# Patient Record
Sex: MALE | Race: WHITE | Employment: FULL TIME | URBAN - METROPOLITAN AREA
[De-identification: names, ages, dates, MRNs, and addresses within clinical notes are randomized per-mention and may not be internally consistent; named-entity substitution may affect disease eponyms.]

---

## 2021-07-25 RX ORDER — DEXTROMETHORPHAN/PSEUDOEPHED 2.5-7.5/.8
1.2 DROPS ORAL
Status: CANCELLED | OUTPATIENT
Start: 2021-07-25

## 2021-07-25 RX ORDER — DIPHENHYDRAMINE HYDROCHLORIDE 50 MG/ML
50 INJECTION, SOLUTION INTRAMUSCULAR; INTRAVENOUS ONCE
Status: CANCELLED | OUTPATIENT
Start: 2021-07-25 | End: 2021-07-25

## 2021-07-25 RX ORDER — SODIUM CHLORIDE 0.9 % (FLUSH) 0.9 %
5-40 SYRINGE (ML) INJECTION AS NEEDED
Status: CANCELLED | OUTPATIENT
Start: 2021-07-25

## 2021-07-25 RX ORDER — SODIUM CHLORIDE 0.9 % (FLUSH) 0.9 %
5-40 SYRINGE (ML) INJECTION EVERY 8 HOURS
Status: CANCELLED | OUTPATIENT
Start: 2021-07-25

## 2021-07-25 RX ORDER — EPINEPHRINE 0.1 MG/ML
1 INJECTION INTRACARDIAC; INTRAVENOUS
Status: CANCELLED | OUTPATIENT
Start: 2021-07-25 | End: 2021-07-26

## 2021-07-25 RX ORDER — ATROPINE SULFATE 0.1 MG/ML
0.5 INJECTION INTRAVENOUS
Status: CANCELLED | OUTPATIENT
Start: 2021-07-25 | End: 2021-07-26

## 2021-07-27 ENCOUNTER — HOSPITAL ENCOUNTER (OUTPATIENT)
Age: 66
Setting detail: OUTPATIENT SURGERY
Discharge: HOME | End: 2021-07-27
Attending: INTERNAL MEDICINE | Admitting: INTERNAL MEDICINE
Payer: MEDICARE

## 2021-07-27 VITALS
OXYGEN SATURATION: 96 % | BODY MASS INDEX: 26.39 KG/M2 | HEIGHT: 66 IN | TEMPERATURE: 96.8 F | WEIGHT: 164.2 LBS | DIASTOLIC BLOOD PRESSURE: 63 MMHG | HEART RATE: 57 BPM | SYSTOLIC BLOOD PRESSURE: 118 MMHG | RESPIRATION RATE: 16 BRPM

## 2021-07-27 PROCEDURE — 76040000007: Performed by: INTERNAL MEDICINE

## 2021-07-27 PROCEDURE — 77030013991 HC SNR POLYP ENDOSC BSC -A: Performed by: INTERNAL MEDICINE

## 2021-07-27 PROCEDURE — 88305 TISSUE EXAM BY PATHOLOGIST: CPT

## 2021-07-27 PROCEDURE — 74011250636 HC RX REV CODE- 250/636: Performed by: INTERNAL MEDICINE

## 2021-07-27 PROCEDURE — 99153 MOD SED SAME PHYS/QHP EA: CPT | Performed by: INTERNAL MEDICINE

## 2021-07-27 PROCEDURE — G0500 MOD SEDAT ENDO SERVICE >5YRS: HCPCS | Performed by: INTERNAL MEDICINE

## 2021-07-27 PROCEDURE — 77030039961 HC KT CUST COLON BSC -D: Performed by: INTERNAL MEDICINE

## 2021-07-27 PROCEDURE — 2709999900 HC NON-CHARGEABLE SUPPLY: Performed by: INTERNAL MEDICINE

## 2021-07-27 PROCEDURE — 77030040361 HC SLV COMPR DVT MDII -B: Performed by: INTERNAL MEDICINE

## 2021-07-27 RX ORDER — MIDAZOLAM HYDROCHLORIDE 1 MG/ML
.25-5 INJECTION, SOLUTION INTRAMUSCULAR; INTRAVENOUS
Status: DISCONTINUED | OUTPATIENT
Start: 2021-07-27 | End: 2021-07-27 | Stop reason: HOSPADM

## 2021-07-27 RX ORDER — FLUMAZENIL 0.1 MG/ML
0.2 INJECTION INTRAVENOUS
Status: DISCONTINUED | OUTPATIENT
Start: 2021-07-27 | End: 2021-07-27 | Stop reason: HOSPADM

## 2021-07-27 RX ORDER — SODIUM CHLORIDE 9 MG/ML
1000 INJECTION, SOLUTION INTRAVENOUS CONTINUOUS
Status: DISCONTINUED | OUTPATIENT
Start: 2021-07-27 | End: 2021-07-27 | Stop reason: HOSPADM

## 2021-07-27 RX ORDER — FENTANYL CITRATE 50 UG/ML
100 INJECTION, SOLUTION INTRAMUSCULAR; INTRAVENOUS
Status: DISCONTINUED | OUTPATIENT
Start: 2021-07-27 | End: 2021-07-27 | Stop reason: HOSPADM

## 2021-07-27 RX ORDER — OMEGA-3-ACID ETHYL ESTERS 1 G/1
2 CAPSULE, LIQUID FILLED ORAL
COMMUNITY

## 2021-07-27 RX ORDER — LEVOTHYROXINE SODIUM 25 UG/1
25 TABLET ORAL
COMMUNITY

## 2021-07-27 RX ORDER — NALOXONE HYDROCHLORIDE 0.4 MG/ML
0.4 INJECTION, SOLUTION INTRAMUSCULAR; INTRAVENOUS; SUBCUTANEOUS
Status: DISCONTINUED | OUTPATIENT
Start: 2021-07-27 | End: 2021-07-27 | Stop reason: HOSPADM

## 2021-07-27 RX ORDER — ATORVASTATIN CALCIUM 10 MG/1
10 TABLET, FILM COATED ORAL DAILY
COMMUNITY

## 2021-07-27 RX ADMIN — SODIUM CHLORIDE 1000 ML: 900 INJECTION, SOLUTION INTRAVENOUS at 08:30

## 2021-07-27 NOTE — DISCHARGE INSTRUCTIONS
René Amador  [id number]  1955    COLON DISCHARGE INSTRUCTIONS    Discomfort:  Redness at IV site- apply warm compress to area; if redness or soreness persist- contact your physician  There may be a slight amount of blood passed from the rectum  Gaseous discomfort- walking, belching will help relieve any discomfort  You may not operate a vehicle til the next day. You may not engage in an occupation involving machinery or appliances til the next day. You may not drink alcoholic beverages til the next day. DIET:   High fiber diet. ACTIVITY:  You may not  resume your normal daily activities til the next day. it is recommended that you spend the remainder of the day resting -  avoid any strenuous activity. CALL M.D.  IF ANY SIGN OF:   Increasing pain, nausea, vomiting  Abdominal distension (swelling)  New increased bleeding (oral or rectal)  Fever (chills)  Pain in chest area  Bloody discharge from nose or mouth  Shortness of breath    You may not  take any Advil, Aspirin, Ibuprofen, Motrin, Aleve, or Goodys for 7 days, ONLY  Tylenol as needed for pain. Post procedure diagnosis:  POLYPS    Follow-up Instructions: Your follow up colonoscopy will be in 10 years. We will notify you the results of your biopsy by letter within 2 weeks.     Gemini Marie MD  July 27, 2021       DISCHARGE SUMMARY from Nurse    The following personal items collected during your admission are returned to you:   Dental Appliance: Dental Appliances: None  Vision: Visual Aid: Glasses, With patient  Hearing Aid:    Jewelry:    Clothing:    Other Valuables:    Valuables sent to safe:              PATIENT INSTRUCTIONS:    After general anesthesia or intravenous sedation, for 24 hours or while taking prescription Narcotics:  · Limit your activities  · Do not drive and operate hazardous machinery  · Do not make important personal or business decisions  · Do  not drink alcoholic beverages  · If you have not urinated within 8 hours after discharge, please contact your surgeon on call. Report the following to your surgeon:  · Excessive pain, swelling, redness or odor of or around the surgical area  · Temperature over 100.5  · Nausea and vomiting lasting longer than 4 hours or if unable to take medications  · Any signs of decreased circulation or nerve impairment to extremity: change in color, persistent  numbness, tingling, coldness or increase pain  · Any questions      No orders of the defined types were placed in this encounter. What to do at Home:  Recommended activity: as above,     If you experience any of the following symptoms as above, please follow up with Dr. Debra Sandoval. *  Please give a list of your current medications to your Primary Care Provider. *  Please update this list whenever your medications are discontinued, doses are      changed, or new medications (including over-the-counter products) are added. *  Please carry medication information at all times in case of emergency situations. These are general instructions for a healthy lifestyle:    No smoking/ No tobacco products/ Avoid exposure to second hand smoke    Surgeon General's Warning:  Quitting smoking now greatly reduces serious risk to your health. Obesity, smoking, and sedentary lifestyle greatly increases your risk for illness    A healthy diet, regular physical exercise & weight monitoring are important for maintaining a healthy lifestyle    You may be retaining fluid if you have a history of heart failure or if you experience any of the following symptoms:  Weight gain of 3 pounds or more overnight or 5 pounds in a week, increased swelling in our hands or feet or shortness of breath while lying flat in bed. Please call your doctor as soon as you notice any of these symptoms; do not wait until your next office visit.     Recognize signs and symptoms of STROKE:    F-face looks uneven    A-arms unable to move or move unevenly    S-speech slurred or non-existent    T-time-call 911 as soon as signs and symptoms begin-DO NOT go       Back to bed or wait to see if you get better-TIME IS BRAIN. The discharge information has been reviewed with the patient and spouse. The patient and spouse verbalized understanding. Warning Signs of HEART ATTACK     Call 911 if you have these symptoms:   Chest discomfort. Most heart attacks involve discomfort in the center of the chest that lasts more than a few minutes, or that goes away and comes back. It can feel like uncomfortable pressure, squeezing, fullness, or pain.  Discomfort in other areas of the upper body. Symptoms can include pain or discomfort in one or both arms, the back, neck, jaw, or stomach.  Shortness of breath with or without chest discomfort.  Other signs may include breaking out in a cold sweat, nausea, or lightheadedness. Don't wait more than five minutes to call 911 - MINUTES MATTER! Fast action can save your life. Calling 911 is almost always the fastest way to get lifesaving treatment. Emergency Medical Services staff can begin treatment when they arrive -- up to an hour sooner than if someone gets to the hospital by car. The discharge information has been reviewed with the patient and caregiver. The patient and caregiver verbalized understanding. Discharge medications reviewed with the patient and guardian and appropriate educational materials and side effects teaching were provided.     Patient armband removed and shredded

## 2021-07-27 NOTE — PROCEDURES
[facility]  Colonoscopy Procedure Report  _______________________________________________________  Patient: Chucho Benitez                                        Attending Physician: Kari Noe MD    Patient ID: [id number]                                    Referring Physician: Colton Brito MD    Exam Date: 2021      Introduction: A  74 y.o. male patient, presents for inpatient Colonoscopy    Indications: Pt of Dr. Tatiana Simpson, here for 5yr Recall, for surveillance of colonic adenomas. Last colonoscopy was done on 2015 by Dr. Jada Das @[facility], 1x polyp removed (Colonic Mucosa  w/Focal Glandular Hyperplasia & Benign lymphoid aggregate), 5yr Recall. Some of old records unavailable at this time  Average risk, no FHx of colon cancer. Mother  of Lung cancer (Mid 63's). Asymptomatic of GI complaints. BMI: 27.28, BM: /day. PM/SH: Thyroid nodule s/p right partial thyroidectomy () - now on Synthroid, HLD, GERD. Consent: The benefits, risks, and alternatives to the procedure were discussed and informed consent was obtained from the patient. Preparation: EKG, pulse, pulse oximetry and blood pressure were monitored throughout the procedure. ASA Classification: Class II- . The heart is an S1-S2 and regular heart rate and rhythm. Lungs are clear to auscultation and percussion. Abdomen is soft, nondistended, and nontender. Mental Status: awake, alert, and oriented to person, place, and time    Medications:  · Fentanyl 100 mcg IV before procedure. · Versed 2 mg IV throughout the procedure. Rectal Exam: Normal Rectal Exam. No Blood. Prostate not enlarged    Pathology Specimens:  1    Procedure: The colonoscope was passed with ease through the anus under direct visualization and advanced to the cecum and 10 cm inside the terminal ileum. Retroflexion is made in the ascending colon. The scope was withdrawn and the mucosa was carefully examined.  The quality of the preparation was excellent. The views were excellent. The patient's toleration of the procedure was excellent. The exam was done twice to the cecum. Total time is 27 minutes and withdrawal time is 16 minutes. Findings:    Rectum:   Small internal hemorrhoids. Sigmoid:   normal  Descending Colon:   Normal   Transverse Colon:   Very mild diverticulosis in the proximal transverse colon. Ascending Colon:   6.5 mm sessile polyp in the proximal ascending colon, cold snared. Cecum:   Normal  Terminal Ileum:   Normal.       Unplanned Events: There were no unplanned events. Estimated Blood Loss: None  IMPLANTS: * No implants in log *  Impressions: Small internal hemorrhoids. Very mild diverticulosis in the proximal transverse colon. 6.5 mm sessile polyp in the proximal ascending colon, cold snared. Normal Mucosa. No blood or AVM found. Complications: None; patient tolerated the procedure well. Recommendations:  · Discharge home when standard parameters are met. · Resume a high fiber diet. · Resume own medications. Avoid all NSAID's for 7 days  · Colonoscopy recommendation in 10 years.   · Take Miralax and/ or Colace 100 mg on regular basis if constipated    Procedure Codes:    Wolfgang Bell [[id number]]    Endoscope Information:  Model Number(s)    [id number]   Assistant: None  Signed By: Gemini Marie MD Date: 7/27/2021

## 2021-07-27 NOTE — H&P
Assessment/Plan  # Detail Type Description    1. Assessment Personal history of colonic polyps (Z86.010). Impression Pt of Dr. Dixie Galicia, here for 5yr Recall, for surveillance of colonic adenomas. Last colonoscopy was done on 2015 by Dr. Tayo Mari @[facility], 1x polyp removed (Colonic Mucosa  w/Focal Glandular Hyperplasia & Benign lymphoid aggregate), 5yr Recall. Some of old records unavailable at this time, any records recovered will be scanned to chart at later date. Average risk, no FHx of colon cancer. Mother  of Lung cancer (Mid 63's). Asymptomatic of GI complaints. BMI: 27.28, BM: 1/day. PM/SH: Thyroid nodule s/p right partial thyroidectomy (2019) - now on Synthroid, HLD, GERD. Patient Plan *C-scope Plan:  Colonoscopy ordered with Dr. Michelle Ballesteros with Miralax bowel prep, and Mag Citrate 2 days before prep, and Miralax and stool softeners starting 3 days before prep.  -Patient is advised to take Thyroid meds, BP meds, beta blocker and any cardiac meds the AM of procedure with sip clear liquid after prep. *C-scope Risks:  Stressed importance of following all bowel preparation instructions. Explained the procedure to the patient including all risks and benefits. These risks consist of missed lesions on exam, bleeding, and bowel perforation with possible need for admission to the hospital, and in the most extensive of  circumstances, the patient may require surgery. Pt verbalized understanding of these risks and is agreeable with this procedure    Plan Orders Further diagnostic evaluations ordered today include(s) DIAGNOSTIC COLONOSCOPY to be performed. 2. Assessment GERD without esophagitis (K21.9). Impression Took Zantac for one year, once Recalled he has been taking Pantoprazole 20mg PRN ever since. Heartburn is well-controlled. Last EGD was done on 2010 by Dr. Tayo Mari @[facility]: ?Short Segment Amaya's Esophagus, Hiatal Hernia (Bx: Negative for Amaya's)  Op-notes unavailable. Patient Plan *PPI Dosing Instructions: (Printed copy provided to patient)  -Instructed patient to take PPI first thing in the morning on an empty stomach with a full glass of water, 30 minutes before eating any food to get the full effect of the medication as intended. Use Tums, or Maalox for breakthrough reflux symptoms. ____________________________  *GERD \"Anti-Reflux Diet\"- Patient is advised to: (Printed copy provided to patient)  1. Eat smaller meals and maintain and lowfat, low carb diet  2. Change to decaffeinated beverages only  3. Not eat within 3-4 hours of bedtime  4. To elevate the torso while sleeping at night either by sleeping on a foam incline wedge or by elevating the head of the bed. 5. Avoid alcohol, NSAIDs, citrus and acidic foods as these things can contribute to acid reflux              This 74year old  patient was referred by Jasmyn Herrera.    This 74year old male presents for Hx polyp/colon cancer. History of Present Illness  1. Hx polyp/colon cancer   Prior screening:  colonoscopy. Denies risk factors. Pertinent negatives include abdominal pain, change in bowel habits, change in stool caliber, constipation, decreased appetite, diarrhea, melena, nausea, rectal bleeding, vomiting, weight gain and weight loss. Additional information: No family history of colon cancer and BM 1x daily. last colonoscopy was done 8/7/15, polyps found ( adenomas). last EGD was done 2010    Past Medical/Surgical History   (Detailed)  Disease/disorder Onset Date Management Date Comments   GERD  On PPI; (Took Zantac x1yr), On Pantoprazole 20mg PRN     Thyroid Nodule  s/p Partial Right Thyroidectomy     Colonic Adenomas:  Negative Exams:  (CM w/LA)     EGD done 2010: ? Short Segment Amaya's Esophagus, Hiatal Hernia - Dr. Kita Martinez             Family History   (Detailed)    Relationship Family Member Name  Age at Death Condition Onset Age Cause of Death       No family history of (CRC) colorectal cancer. N   Mother  Y 72 Lung Cancer (Smoker) 61 Y     Social History  (Detailed)  Tobacco use reviewed. Preferred language is [place]. Marital Status/Family/Social Support  Marital status:      Tobacco use status: Current non-smoker. Smoking status: Never smoker. Tobacco Screening  Patient has never used tobacco. Patient has not used tobacco in the last 30 days. Patient has not used smokeless tobacco in the last 30 days. Smoking Status  Type Smoking Status Usage Per Day Years Used Pack Years Total Pack Years    Never smoker         Alcohol  There is no history of alcohol use. Caffeine  The patient uses caffeine: coffee - 1 cup a day. Medications (active prior to today)  Medication Instructions Start Date Stop Date Refilled Elsewhere   Synthroid 25 mcg tablet take 1 tablet by oral route  every day //   Y     Patient Status   Completed with information received for patient in a summary of care record. Medication Reconciliation  Medications reconciled today. Medication Reviewed  Adherence Medication Name Sig Desc Elsewhere Status   taking as directed Synthroid 25 mcg tablet take 1 tablet by oral route  every day Y Verified     Medications (Added, Continued or Stopped today)  Start Date Medication Directions PRN Status PRN Reason Instruction Stop Date   07/13/2021 Gas-X Extra Strength 125 mg chewable tablet Follow colonoscopy bowel preparation instructions. N      07/13/2021 magnesium citrate oral solution Follow colonoscopy bowel preparation instructions. N      07/13/2021 Miralax 17 gram/dose oral powder Follow colonoscopy procedure bowel preparation instructions. N       Synthroid 25 mcg tablet take 1 tablet by oral route  every day N        Orders  Status Lab Order Time Frame Comments   ordered DIAGNOSTIC COLONOSCOPY         Review of Systems  System Neg/Pos Details   Constitutional Negative Fever, Weight gain and Weight loss.    ENMT Negative Sinus Infection. Eyes Negative Double vision. Respiratory Negative Asthma, Chronic cough and Dyspnea. Cardio Negative Chest pain, Edema and Irregular heartbeat/palpitations. GI Negative Abdominal pain, Change in bowel habits, Change in stool caliber, Constipation, Decreased appetite, Diarrhea, Dysphagia, Heartburn, Hematemesis, Hematochezia, Melena, Nausea, Rectal bleeding, Reflux and Vomiting.  Negative Dysuria and Hematuria. Endocrine Negative Cold intolerance and Heat intolerance. Neuro Negative Dizziness, Headache, Numbness and Tremors. Psych Negative Anxiety, Depression and Increased stress. Integumentary Negative Hives, Pruritus and Rash. MS Negative Back pain, Joint pain and Myalgia. Hema/Lymph Negative Easy bleeding, Easy bruising and Lymphadenopathy. Allergic/Immuno Negative Food allergies and Immunosuppression. Vital Signs   Height  Time ft in cm Last Measured Height Position   8:22 AM 5.0 6.00 167.64 07/13/2021 Standing     Weight/BSA/BMI  Time lb oz kg Context BMI kg/m2 BSA m2   8:22 .00  76.657 dressed with shoes 27.28 1.89     Blood Pressure  Time BP mm/Hg Position Side Site Method Cuff Size   8:22 /80 sitting right arm automatic adult     Temperature/Pulse/Respiration  Time Temp F Temp C Temp Site Pulse/min Pattern Resp/ min   8:22 AM 98.00 36.67  70  18     Measured by  Time Measured by   8:22 AM Jolynn Powell     Physical  Exam  Exam Findings Details   Constitutional Normal Well developed. Eyes Normal Conjunctiva - Right: Normal, Left: Normal. Sclera - Right: Normal, Left: Normal.   Nasopharynx Normal Lips/teeth/gums - Normal.   Neck Exam Normal Inspection - Normal.   Respiratory Normal Inspection - Normal.   Cardiovascular Normal Rhythm - Regular. Extra sounds - None. Murmurs - None.    Vascular Normal Pulses - Brachial: Normal.   Skin Normal Inspection - Normal.   Musculoskeletal Normal Hands/Wrist - Right: Normal, Left: Normal.   Extremity Normal No edema. Neurological Normal Fine motor skills - Normal.   Psychiatric Normal Orientation - Oriented to time, place, person & situation. Appropriate mood and affect.      Active Patient Care Team Members  Name Contact Agency Type Support Role Relationship Active Date Inactive Date Specialty   Ramona Mcburney   Patient provider PCP      [facility]   encounter provider    Gastroenterology       No change in H&P

## 2021-09-15 ENCOUNTER — HOSPITAL ENCOUNTER (OUTPATIENT)
Dept: PHYSICAL THERAPY | Age: 66
Discharge: HOME | End: 2021-09-15
Payer: MEDICARE

## 2021-09-15 PROCEDURE — 97161 PT EVAL LOW COMPLEX 20 MIN: CPT

## 2021-09-15 PROCEDURE — 97110 THERAPEUTIC EXERCISES: CPT

## 2021-09-15 NOTE — PROGRESS NOTES
[facility] at [address] [place] [address] Drive: [phone]   Fax: [phone]  PLAN OF CARE / [address] [place] [address] PHYSICAL THERAPY SERVICES  Patient Name: Ricardo Bardales : 1955   Medical   Diagnosis: Left shoulder pain [M25.512]  Left ankle pain [M25.572] Treatment Diagnosis: Left shoulder pain  Left ankle pain   Onset Date: 2021 - ankle  2021 - shoulder     Referral Source: Mona Duffy MD Start of Care [facility]): 9/15/2021   Prior Hospitalization: See medical history Provider #: [id number]   Prior Level of Function: avid bicyclist 2-3 times per week   Comorbidities: hypothyroid, recurrent ankle sprains bilaterally   Medications: Verified on Patient Summary List   The Plan of Care and following information is based on the information from the initial evaluation.   ===========================================================================================  Assessment / key information:  Ricardo Bardales is a 74 y.o.  male who presents with  signs/symtoms of left glenohumeral joint capsular tightness resulting in left shoulder decreased AROM and functional strength and pain with sleeping on left side. Patient also presents with hx of recurrent bilateral ankle sprains most recent with severe left ankle sprain resulting in decreased AROM, strength and balance left ankle. Patient will continue to benefit from skilled PT services to modify and progress therapeutic interventions, address functional mobility deficits, address ROM deficits, address strength deficits, analyze and address soft tissue restrictions, analyze and cue movement patterns, analyze and modify body mechanics/ergonomics and assess and modify postural abnormalities to attain remaining goals.     Pt instructed in HEP and will f/u in clinic for PT.  ===========================================================================================  Eval Complexity: History MEDIUM Complexity : 1-2 comorbidities / personal factors will impact the outcome/ POC ;  Examination  LOW Complexity : 1-2 Standardized tests and measures addressing body structure, function, activity limitation and / or participation in recreation ; Presentation LOW Complexity : Stable, uncomplicated ;  Decision Making MEDIUM Complexity : FOTO score of 26-74; : FOTO score = an established functional score where 100 = no disability  Overall Complexity LOW   Problem List: pain affecting function, decrease ROM, decrease strength, impaired gait/ balance, decrease ADL/ functional abilitiies, decrease activity tolerance and decrease flexibility/ joint mobility   Treatment Plan may include any combination of the following: Therapeutic exercise, Therapeutic activities, Neuromuscular re-education, Physical agent/modality, Gait/balance training, Manual therapy, Aquatic therapy, Patient education, Functional mobility training and Home safety training  Patient / Family readiness to learn indicated by: asking questions, trying to perform skills and interest  Persons(s) to be included in education: patient (P)  Barriers to Learning/Limitations: no  Measures Taken: NA  Patient Goal (s): \"Be able to sleep on left side without pain. \"   Patient self reported health status: good  Rehabilitation Potential: good  Goals:  Short Term Goals: To be accomplished in 4 weeks:   Patient will report compliance with initial/basic HEP at least 1x/day to aid in rehabilitation program.   Status at IE: Patient instructed in and provided written copy of initial Home Exercise Program.   Current: Same as IE     Patient will increase left shoulder pain free AROM into flexion and abduction to 150 degrees to aid in completion of ADLs. Status at IE: flex 132, abd 141   Current: Same as IE      Long Term Goals:  To be accomplished in 8 weeks:   Patient will report compliance with comprehensive HEP a least 3-4x/week to aid in rehabilitation/strengthening program.   Status at IE: Patient instructed in and provided written copy of initial Home Exercise Program.   Current: Same as IE     Patient will report no pain greater than 1-2/10 with overhead activities to aid in completion of ADLs. Status at IE: left shoulder 2-8/10   Current: Same as IE     Patient will increase left shoulder and left ankle strength to 5/5 throughout all planes to aid in completion of ADLs. Status at IE:  Left shoulder flex and ER 4-, abduction 3+. Left ankle DF, Inv, EV 4   Current: Same as IE     Patient will increase FOTO (an established functional score where 100 = no disability) score left ankle and left shoulder  to 75 points overall to demonstrate improvement in functional status. Status at IE: left shoulder 70, left ankle 57   Current: Same as IE        Frequency / Duration:   Patient to be seen 2  times per week for 8  weeks:  Patient / Caregiver education and instruction: self care and exercises      . Therapist Signature: Nena Guerrero DPT Date: 6/38/6882   Certification Period: 9/15/2021 to 12/11/2021 Time: 12:11 PM   ===========================================================================================  I certify that the above Physical Therapy Services are being furnished while the patient is under my care. I agree with the treatment plan and certify that this therapy is necessary. Physician Signature:        Date:       Time:        Mars Benavidez MD    Please sign and return to [facility] or you may fax the signed copy to [phone]. Thank you.

## 2021-09-15 NOTE — PROGRESS NOTES
PT DAILY TREATMENT NOTE/SHOULDER EVAL 10-18    Patient Name: Renetta Huizar  Date:9/15/2021  : 1955  [x]  Patient  Verified  Payor: Farzad e[employer]ldman / Plan: [place] / Product Type: Medicare /    In time: 8521  Out time:1148  Total Treatment Time (min): 25  Visit #: 1 of 16    Medicare/BCBS Only   Total Timed Codes (min):  25 1:1 Treatment Time:  25       Treatment Area: Left shoulder pain [M25.512]  Left ankle pain [M25.572]    SUBJECTIVE  Pain Level (0-10 scale): left shoulder 2-8, left ankle 2-8  []constant [x]intermittent []improving []worsening []no change since onset    Any medication changes, allergies to medications, adverse drug reactions, diagnosis change, or new procedure performed?: [x] No    [] Yes (see summary sheet for update)  Subjective functional status/changes:     Patient has c/c of left ankle pain since 2021 when he sustained an inversion sprain and has noted persistent pain, swelling and clicking since that time. Patient also notes gradual onset of left shoulder pain since 2021 which has been interfering primarily with ability to sleep on left side. Patient describes pain as deep ache inside left shoulder and anterolateral \"soreness\" left ankle. Pain is worse in PM. Denies numbness/tingling. Aggravating factors: . Alleviating factors: shoulder reaching behind back and laying on left side, weight bearing activities. Denies red flags: SOB, chest pain, dizziness/lightheadedness, blurred/double vision, HA, chills/fevers, night sweats, change in bowel/bladder control, abdominal pain, difficulty swallowing, slurred speech, unexplained weight gain/loss, nausea, vomiting. PMHx: hypothyroid, recurrent ankle sprains bilaterally. Surgical Hx: partial thyroidectomy. Social Hx: , two level home, no alcohol, no tobacco, full time sedentary work. PLOF: avid bicyclist 2-3 times per week. CLOF: moderate difficulty sleeping due to left UE pain.   Diagnostic Imaging: none. Recent falls Hx:none. OBJECTIVE/EXAMINATION    Precautions: none    20 min [x]Eval                  []Re-Eval       25 min Therapeutic Exercise:  [x] See flow sheet :   Rationale: increase ROM, increase strength and decrease pain to improve the patients ability to complete ADLs            With   [] TE   [] TA   [] neuro   [] other: Patient Education: [x] Review HEP    [] Progressed/Changed HEP based on:   [] positioning   [] body mechanics   [] transfers   [] heat/ice application    [] other:        Physical Therapy Evaluation - Shoulder    Posture: [] Poor    [] Fair    [x] Good    Describe:    ROM:  [] Unable to assess at this time                                           AROM                                                              Strength   Left Right  Left Right   Flexion 132 157 Flexion 4- 5   Extension 51 75 Extension 5 5   Scaption/ 168 Scaption/ABD 3+ 5   ER @ 0 Degrees 65 75 ER @ 0 Degrees 4- 5   IR @ 60 Degrees 75 75 ER @ 90 Degrees 4- 5   Apley IR T11 T5 IR @ 90 Degrees 5 5     End Feel / Painful Arc: positive left    Scapulohumoral Control / Rhythm:  Able to eccentrically lower with good control?  Left: [] Yes   [x] No     Right: [x] Yes   [] No    Accessory Motions:    Palpation  [x] Min  [] Mod  [] Severe    Location: left glenohumeral joint  [] Min  [] Mod  [] Severe    Location:    Adson's Test  [] Pos   [x] Neg Yergason's Test [] Pos   [x] Neg  Calista's Test  [] Pos   [x] Neg Seneca's Sign [] Pos   [] Neg  Neer's Test  [] Pos   [x] Neg Clunk Test  [] Pos   [] Neg  Hawkin's Test  [] Pos   [x] Neg AC Joint  [] Pos   [] Neg  Speed's Test  [] Pos   [] Neg SC Joint  [] Pos   [] Neg  Empty Can  [x] Pos   [] Neg Pectoral Tightness [] Pos   [] Neg  Anterior Apprehension [] Pos   [x] Neg   Posterior Apprehension [] Pos   [x] Neg    Physical Therapy Evaluation  - Foot and Ankle    Gait: [] Normal    [] Abnormal    [x] Antalgic    [] NWB    Device: none  Describe: minimally antalgic left LE    ROM/Strength  [] Unable to assess at this time      AROM   Strength (1-5)   Left Right Left  Right   Dorsiflexion 17 15 4 5   Plantarflexion 65 60 5 5   Inversion 38 31 5 5   Eversion 7 13 5 5       Palpation:   Location:  Patient's Pain Response: [x] Min   [] Mod   [] Severe  Location: left anterior talofibular ligament  Patient's Pain Response: [] Min   [] Mod   [] Severe    Optional Tests:    Anterior Drawer: [] Neg    [x] Pos left  Posterior Drawer:  [] Neg    [x] Pos left  Inversion Stress:  [] Neg    [x] Pos left>right  Talar Tilt:   [] Neg    [x] Pos left>right  Eversion Stress:  [x] Neg    [] Pos  Darius's Sign:  [x] Neg    [] Pos  Velasco Test: [x] Neg    [] Pos    Other Tests / Comments:     Single Leg Stance Test: Eyes open right 30 seconds, left 30 seconds with marked increased sway. Eyes closed right 5 seconds, left 0 seconds. Pain Level (0-10 scale) post treatment: left ankle 1, left shoulder 0    ASSESSMENT/Changes in Function: Patient presents with signs/symtoms of left glenohumeral joint capsular tightness resulting in left shoulder decreased AROM and functional strength and pain with sleeping on left side. Patient also presents with hx of recurrent bilateral ankle sprains most recent with severe left ankle sprain resulting in decreased AROM, strength and balance left ankle. Patient will continue to benefit from skilled PT services to modify and progress therapeutic interventions, address functional mobility deficits, address ROM deficits, address strength deficits, analyze and address soft tissue restrictions, analyze and cue movement patterns, analyze and modify body mechanics/ergonomics and assess and modify postural abnormalities to attain remaining goals.      [x]  See Plan of Care  []  See progress note/recertification  []  See Discharge Summary         Progress towards goals / Updated goals:  See POC    PLAN  []  Upgrade activities as tolerated     [x]  Continue plan of care  []  Update interventions per flow sheet       []  Discharge due to:_  []  Other:_      Dudley Kasper, PT 9/15/2021  11:01 AM

## 2021-09-16 ENCOUNTER — HOSPITAL ENCOUNTER (OUTPATIENT)
Dept: PHYSICAL THERAPY | Age: 66
Discharge: HOME | End: 2021-09-16
Payer: MEDICARE

## 2021-09-16 PROCEDURE — 97110 THERAPEUTIC EXERCISES: CPT

## 2021-09-16 PROCEDURE — 97112 NEUROMUSCULAR REEDUCATION: CPT

## 2021-09-16 NOTE — PROGRESS NOTES
PT DAILY TREATMENT NOTE    Patient Name: Luis Brush  Date:2021  : 1955  [x]  Patient  Verified  Payor: Coty Conti / Plan: [place] MED[place]ARE PART A & B / Product Type: Medicare /    In time:1015  Out time:1102  Total Treatment Time (min): 47  Total Timed Codes (min): 47  1:1 Treatment Time ( [address] only): 43   Visit #: 2 of 16    Treatment Dx: Left shoulder pain [M25.512]  Left ankle pain [M25.572]    SUBJECTIVE  Pain Level (0-10 scale): 2  Any medication changes, allergies to medications, adverse drug reactions, diagnosis change, or new procedure performed?: [x] No    [] Yes (see summary sheet for update)  Subjective functional status/changes:   [] No changes reported  \"I have been doing the stretches at home and they do help the shoulder to feel noticeably better. \"    OBJECTIVE    30 min Therapeutic Exercise:  [x] See flow sheet :   Rationale: increase ROM, increase strength and decrease pain to improve the patients ability to complete ADLs  ambulation safety and efficiency in order to improve patient's ability to safely ambulate at home for self care. 13 min Neuromuscular Re-education:  [x]  See flow sheet :   Rationale: improve coordination, improve balance and increase proprioception  to improve the patients ability to complete ADLS, and decrease falls risk    With   [] TE   [] TA   [] neuro   [] other: Patient Education: [x] Review HEP    [] Progressed/Changed HEP based on:   [] positioning   [] body mechanics   [] transfers   [] heat/ice application    [] other:      Other Objective/Functional Measures: NA     Pain Level (0-10 scale) post treatment: 1    ASSESSMENT/Changes in Function: Patient instructed in additional exercises for both shoulder and ankle and provided written copy of new exercises for HEP. Patient responds well to treatment session. No adverse effects were noted from today's treatment session.      Patient will continue to benefit from skilled PT services to modify and progress therapeutic interventions, address functional mobility deficits, address ROM deficits, address strength deficits, analyze and address soft tissue restrictions, analyze and cue movement patterns, analyze and modify body mechanics/ergonomics, assess and modify postural abnormalities,  and instruct in home and community integration to attain remaining goals. [x]  See Plan of Care  []  See progress note/recertification  []  See Discharge Summary         Progress towards goals / Updated goals:  Short Term Goals: To be accomplished in 4 weeks:                   Patient will report compliance with initial/basic HEP at least 1x/day to aid in rehabilitation program.                   Status at IE: Patient instructed in and provided written copy of initial Home Exercise Program.                   Current: Patient reports good compliance with initial HEP and provided written copy of additions to HEP.     9/16/2021                      Patient will increase left shoulder pain free AROM into flexion and abduction to 150 degrees to aid in completion of ADLs. Status at IE: flex 132, abd 141                   Current: Same as IE        Long Term Goals: To be accomplished in 8 weeks:                   Patient will report compliance with comprehensive HEP a least 3-4x/week to aid in rehabilitation/strengthening program.                   Status at IE: Patient instructed in and provided written copy of initial Home Exercise Program.                   Current: Same as IE                      Patient will report no pain greater than 1-2/10 with overhead activities to aid in completion of ADLs. Status at IE: left shoulder 2-8/10                   Current: Same as IE                      Patient will increase left shoulder and left ankle strength to 5/5 throughout all planes to aid in completion of ADLs. Status at IE:  Left shoulder flex and ER 4-, abduction 3+.  Left ankle DF, Inv, EV 4                   Current: Same as IE                      Patient will increase FOTO (an established functional score where 100 = no disability) score left ankle and left shoulder  to 75 points overall to demonstrate improvement in functional status.                     Status at IE: left shoulder 70, left ankle 57                   Current: Same as IE    PLAN  []  Upgrade activities as tolerated     [x]  Continue plan of care  []  Update interventions per flow sheet       []  Discharge due to:_  []  Other:_      Florencio Ramirez PT 9/16/2021  10:18 AM    Future Appointments   Date Time Provider Cheryle Jara   9/22/2021  1:45 PM Duke Martins [facility]   9/27/2021  4:00 PM Duke Ospina [facility]   9/29/2021  8:00 AM GRACIELA Ospina [facility]   10/6/2021  9:30 AM GRACIELA Martins [facility]   10/8/2021  9:30 AM GRACIELA Martins [facility]

## 2021-09-22 ENCOUNTER — HOSPITAL ENCOUNTER (OUTPATIENT)
Dept: PHYSICAL THERAPY | Age: 66
Discharge: HOME | End: 2021-09-22
Payer: MEDICARE

## 2021-09-22 PROCEDURE — 97112 NEUROMUSCULAR REEDUCATION: CPT

## 2021-09-22 PROCEDURE — 97110 THERAPEUTIC EXERCISES: CPT

## 2021-09-22 NOTE — PROGRESS NOTES
PT DAILY TREATMENT NOTE    Patient Name: Ricardo Bardales  Date:2021  : 1955  [x]  Patient  Verified  Payor: Evelyn [place] / Plan: [place] MEDICARE PART A & B / Product Type: Medicare /    In time:147  Out time:227  Total Treatment Time (min): 40  Total Timed Codes (min): 40  1:1 Treatment Time ( [address] only): 40   Visit #: 3 of 16    Treatment Dx: Left shoulder pain [M25.512]  Left ankle pain [M25.572]    SUBJECTIVE  Pain Level (0-10 scale): 0-1  Any medication changes, allergies to medications, adverse drug reactions, diagnosis change, or new procedure performed?: [x] No    [] Yes (see summary sheet for update)  Subjective functional status/changes:   [] No changes reported  \"The exercises are really helping. My left shoulder definitely feels better. \"    OBJECTIVE    30 min Therapeutic Exercise:  [x] See flow sheet :   Rationale: increase ROM, increase strength and decrease pain to improve the patients ability to complete ADLs  ambulation safety and efficiency in order to improve patient's ability to safely ambulate at home for self care. 10 min Neuromuscular Re-education:  [x]  See flow sheet :   Rationale: improve coordination, improve balance and increase proprioception  to improve the patients ability to complete ADLS, and decrease falls risk    With   [] TE   [] TA   [] neuro   [] other: Patient Education: [x] Review HEP    [] Progressed/Changed HEP based on:   [] positioning   [] body mechanics   [] transfers   [] heat/ice application    [] other:      Other Objective/Functional Measures: NA     Pain Level (0-10 scale) post treatment: 0    ASSESSMENT/Changes in Function: Patient demonstrating excellent initial response to PT for left shoulder with ROM and exercise tolerance quickly improving. Patient responds well to treatment session. No adverse effects were noted from today's treatment session.      Patient will continue to benefit from skilled PT services to modify and progress therapeutic interventions, address functional mobility deficits, address ROM deficits, address strength deficits, analyze and address soft tissue restrictions, analyze and cue movement patterns, analyze and modify body mechanics/ergonomics, assess and modify postural abnormalities,  and instruct in home and community integration to attain remaining goals. [x]  See Plan of Care  []  See progress note/recertification  []  See Discharge Summary         Progress towards goals / Updated goals:  Short Term Goals: To be accomplished in 4 weeks:                   [facility] will report compliance with initial/basic HEP at least 1x/day to aid in rehabilitation program.                   Status at IE: Patient instructed in and provided written copy of initial Home Exercise Program.                   Current: Patient reports good compliance with initial HEP and provided written copy of additions to HEP.     9/16/2021                      Patient will increase left shoulder pain free AROM into flexion and abduction to 150 degrees to aid in completion of ADLs.                  Status at IE: flex 132, abd 141                   Current: flex improved to 142, abd remains 141.     9/22/2021, progressing        Long Term Goals: To be accomplished in 8 weeks:                   Patient will report compliance with comprehensive HEP a least 3-4x/week to aid in rehabilitation/strengthening program.                   Status at IE: Patient instructed in and provided written copy of initial Home Exercise Program.                   Current: Same as IE                      Patient will report no pain greater than 1-2/10 with overhead activities to aid in completion of ADLs.                  Status at IE: left shoulder 2-8/10                   Current: Same as IE                      Patient will increase left shoulder and left ankle strength to 5/5 throughout all planes to aid in completion of ADLs.                    Status at IE:  Left shoulder flex and ER 4-, abduction 3+. Left ankle DF, Inv, EV 4                   Current: Same as IE                      Patient will increase FOTO (an established functional score where 100 = no disability) score left ankle and left shoulder  to 75 points overall to demonstrate improvement in functional status.                     Status at IE: left shoulder 70, left ankle 57                   Current: Same as IE       PLAN  []  Upgrade activities as tolerated     [x]  Continue plan of care  []  Update interventions per flow sheet       []  Discharge due to:_  []  Other:_      Sulma Pendleton, PT 9/22/2021  1:50 PM    Future Appointments   Date Time Provider Cheryle Jara   9/27/2021  4:00 PM Duke Galvez [facility]   9/29/2021  8:00 AM Jeremiah Ma, GRACIELA CAICEDO [facility]   10/6/2021  9:30 AM Benji Zelaya, GRACIELA CAICEDO [facility]   10/8/2021  9:30 AM Nickolas Ortiz, GRACIELA CAICEDO [facility]

## 2021-09-24 ENCOUNTER — HOSPITAL ENCOUNTER (OUTPATIENT)
Dept: PHYSICAL THERAPY | Age: 66
Discharge: HOME | End: 2021-09-24
Payer: MEDICARE

## 2021-09-24 PROCEDURE — 97112 NEUROMUSCULAR REEDUCATION: CPT

## 2021-09-24 PROCEDURE — 97110 THERAPEUTIC EXERCISES: CPT

## 2021-09-24 NOTE — PROGRESS NOTES
PT DAILY TREATMENT NOTE    Patient Name: Sloan Arce  Date:2021  : 1955  [x]  Patient  Verified  Payor: Yoni Bonilla / Plan: [place] MEDICARE PART A & B / Product Type: Medicare /    In time:10:08  Out time:10:57  Total Treatment Time (min): 49  Total Timed Codes (min): 49  1:1 Treatment Time ([facility] only): 52   Visit #: 4 of 16    Treatment Area: Left shoulder pain [M25.512]  Left ankle pain [M25.572]    SUBJECTIVE  Pain Level (0-10 scale): 0/10  Any medication changes, allergies to medications, adverse drug reactions, diagnosis change, or new procedure performed?: [x] No    [] Yes (see summary sheet for update)  Subjective functional status/changes:   [] No changes reported  Pt reports he has pain in the shoulders when he gets his arm stuck in one position. Pt reports he has achiness in the ankle off and on and he will have swelling when he overdoes it. OBJECTIVE        34 min Therapeutic Exercise:  [x] See flow sheet :   Rationale: increase ROM, increase strength and improve coordination to improve the patients ability to increase patient's ease with performing functional activities and decrease overall pain and symptoms. 15 min Neuromuscular Re-education:  [x]  See flow sheet :   Rationale: improve coordination, improve balance, and increase proprioception  to improve the patients ability to increase patient's ease with performing functional activities and decrease overall pain and symptoms. With   [x] TE   [] TA   [x] neuro   [] other: Patient Education: [x] Review HEP    [] Progressed/Changed HEP based on:   [] positioning   [] body mechanics   [] transfers   [] heat/ice application    [] other:      Other Objective/Functional Measures: NA    Pain Level (0-10 scale) post treatment: 0/10    ASSESSMENT/Changes in Function: Patient tolerated treatment well with no adverse reactions today.  Pt is progressing with therapy as indicated by pt tolerating increase in exercise repetitions and resistance. Pt required cuing during BOSU step overs to increase weight shift into the forefoot as patient tended to increase weight posteriorly and fall backwards. Pt required cuing during shoulder extension to ensure upright posture. Pt required tactile and visual cuing to left shoulder during shoulder abduction and prone Y, T I for proper gleonhumeral and scapulothoracic mechanics. Pt required manual cuing to perform D2 flexion. Although showing progress patient would benefit from continuation of skilled physical therapy to address the remaining limitations. Patient will continue to benefit from skilled PT services to modify and progress therapeutic interventions, address functional mobility deficits, address ROM deficits, address strength deficits, analyze and address soft tissue restrictions, analyze and cue movement patterns, analyze and modify body mechanics/ergonomics and assess and modify postural abnormalities to attain remaining goals. []  See Plan of Care  []  See progress note/recertification  []  See Discharge Summary         Progress towards goals / Updated goals:  Short Term Goals: To be accomplished in 4 weeks:                   NLPHIGX will report compliance with initial/basic HEP at least 1x/day to aid in rehabilitation program.                   Status at IE: Patient instructed in and provided written copy of initial Home Exercise Program.                   Current: Patient reports good compliance with initial HEP and provided written copy of additions to HEP.     9/16/2021                      Patient will increase left shoulder pain free AROM into flexion and abduction to 150 degrees to aid in completion of ADLs.                    Status at IE: flex 132, abd 141                   Current: flex improved to 142, abd remains 141.     9/22/2021, progressing        Long Term Goals: To be accomplished in 8 weeks:                   Patient will report compliance with comprehensive HEP a least 3-4x/week to aid in rehabilitation/strengthening program.                   Status at IE: Patient instructed in and provided written copy of initial Home Exercise Program.                   Current: pt reports continued compliance with HEP 9/24/21                      Patient will report no pain greater than 1-2/10 with overhead activities to aid in completion of ADLs.                  Status at IE: left shoulder 2-8/10                   Current: pt reports 0/10 pain in shoulder and ankle today. Progressing 9/24/21                      Patient will increase left shoulder and left ankle strength to 5/5 throughout all planes to aid in completion of ADLs.                  Status at IE:  Left shoulder flex and ER 4-, abduction 3+. Left ankle DF, Inv, EV 4                   Current: Same as IE                      Patient will increase FOTO (an established functional score where 100 = no disability) score left ankle and left shoulder  to 75 points overall to demonstrate improvement in functional status.                     Status at IE: left shoulder 70, left ankle 57                   Current: Same as IE       PLAN  [x]  Upgrade activities as tolerated     [x]  Continue plan of care  [x]  Update interventions per flow sheet       []  Discharge due to:_  []  Other:_      Eryn Teixeira, PT 9/24/2021  10:14 AM    Future Appointments   Date Time Provider Cheryle Jara   9/24/2021 10:15 AM Naz Elias, GRACIELA CAICEDO [facility]   9/27/2021  4:00 PM Duke Bah [facility]   9/29/2021  8:00 AM Duke Bah [facility]   10/6/2021  9:30 AM Divina Rojas, GRACIELA CAICEDO [facility]   10/8/2021  9:30 AM Yuan Guzman, GRACIELA CAICEDO [facility]

## 2021-09-27 ENCOUNTER — HOSPITAL ENCOUNTER (OUTPATIENT)
Dept: PHYSICAL THERAPY | Age: 66
Discharge: HOME | End: 2021-09-27
Payer: MEDICARE

## 2021-09-27 PROCEDURE — 97112 NEUROMUSCULAR REEDUCATION: CPT

## 2021-09-27 PROCEDURE — 97110 THERAPEUTIC EXERCISES: CPT

## 2021-09-27 NOTE — PROGRESS NOTES
PT DAILY TREATMENT NOTE    Patient Name: Elizabeth Gonzales  Date:2021  : 1955  [x]  Patient  Verified  Payor: Maida Garland / Plan: [place] / Product Type: Medicare /    In time:4:00  Out time:4:45  Total Treatment Time (min): 45  Total Timed Codes (min): 45  1:1 Treatment Time ( [address] only): 45   Visit #: 5 of 16    Treatment Area: Left shoulder pain [M25.512]  Left ankle pain [M25.572]    SUBJECTIVE  Pain Level (0-10 scale): 0/10  Any medication changes, allergies to medications, adverse drug reactions, diagnosis change, or new procedure performed?: [x] No    [] Yes (see summary sheet for update)  Subjective functional status/changes:   [] No changes reported  Patient reports he felt fine after last session. Pt reports he still has tightness behind his back but after he stretches it once it feels better. OBJECTIVE      25 min Therapeutic Exercise:  [x] See flow sheet :   Rationale: increase ROM, increase strength, improve coordination and increase proprioception to improve the patients ability to increase patient's ease with performing functional activities and decrease overall pain and symptoms. 20 min Neuromuscular Re-education:  [x]  See flow sheet :   Rationale: improve coordination, improve balance, and increase proprioception  to improve the patients ability to increase patient's ease with performing functional activities and decrease overall pain and symptoms. With   [x] TE   [] TA   [x] neuro   [] other: Patient Education: [x] Review HEP    [] Progressed/Changed HEP based on:   [] positioning   [] body mechanics   [] transfers   [] heat/ice application    [] other:      Other Objective/Functional Measures: FOTO:  as indicated below. Pain Level (0-10 scale) post treatment: 0/10    ASSESSMENT/Changes in Function: Patient tolerated treatment well with no adverse reactions today.  Pt is progressing with therapy as indicated by pt tolerating increase in exercise repetitions and resistance. Pt continues to be challenged with BOSU step overs and demonstrates posterior LOB. Pt required verbal cuing to increase weight shift in to forefoot to prevent LOB. Pt was also challenged with star excursion, however required less resting breaks this session compared to last session. Although showing progress patient would benefit from continuation of skilled physical therapy to address the remaining limitations. Patient will continue to benefit from skilled PT services to modify and progress therapeutic interventions, address functional mobility deficits, address ROM deficits, address strength deficits, analyze and address soft tissue restrictions, analyze and cue movement patterns, analyze and modify body mechanics/ergonomics and assess and modify postural abnormalities to attain remaining goals. []  See Plan of Care  []  See progress note/recertification  []  See Discharge Summary         Progress towards goals / Updated goals:  Short Term Goals: To be accomplished in 4 weeks:                   JAMES will report compliance with initial/basic HEP at least 1x/day to aid in rehabilitation program.                   Status at IE: Patient instructed in and provided written copy of initial Home Exercise Program.                   Current: Patient reports good compliance with initial HEP and provided written copy of additions to HEP.     9/16/2021                      Patient will increase left shoulder pain free AROM into flexion and abduction to 150 degrees to aid in completion of ADLs.                    Status at IE: flex 132, abd 141                   Current: flex improved to 145, abd remains 165.     9/27/2021, progressing        Long Term Goals: To be accomplished in 8 weeks:                   Patient will report compliance with comprehensive HEP a least 3-4x/week to aid in rehabilitation/strengthening program.                   Status at IE: Patient instructed in and provided written copy of initial Home Exercise Program.                   Current: pt reports continued compliance with HEP 9/24/21                      Patient will report no pain greater than 1-2/10 with overhead activities to aid in completion of ADLs.                  Status at IE: left shoulder 2-8/10                   Current: pt reports 0/10 pain in shoulder and ankle today. Progressing 9/24/21                      Patient will increase left shoulder and left ankle strength to 5/5 throughout all planes to aid in completion of ADLs.                  Status at IE:  Left shoulder flex and ER 4-, abduction 3+. Left ankle DF, Inv, EV 4                   Current: Same as IE                      Patient will increase FOTO (an established functional score where 100 = no disability) score left ankle and left shoulder  to 75 points overall to demonstrate improvement in functional status.                     Status at IE: left shoulder 70, left ankle 57                   Current:  left shoulder 78, left ankle 59            PLAN  [x]  Upgrade activities as tolerated     [x]  Continue plan of care  []  Update interventions per flow sheet       []  Discharge due to:_  []  Other:_      Nahomi Simmons PT 9/27/2021  3:58 PM    Future Appointments   Date Time Provider Cheryle Jara   9/27/2021  4:00 PM Duke Campa [facility]   9/29/2021  8:00 AM GRACIELA Campa [facility]   10/6/2021  9:30 AM GRACIELA Rojas [facility]   10/8/2021  9:30 AM GRACIELA Haynes [facility]

## 2021-09-29 ENCOUNTER — HOSPITAL ENCOUNTER (OUTPATIENT)
Dept: PHYSICAL THERAPY | Age: 66
Discharge: HOME | End: 2021-09-29
Payer: MEDICARE

## 2021-09-29 PROCEDURE — 97110 THERAPEUTIC EXERCISES: CPT

## 2021-09-29 PROCEDURE — 97530 THERAPEUTIC ACTIVITIES: CPT

## 2021-10-06 ENCOUNTER — HOSPITAL ENCOUNTER (OUTPATIENT)
Dept: PHYSICAL THERAPY | Age: 66
Discharge: HOME | End: 2021-10-06
Payer: MEDICARE

## 2021-10-06 PROCEDURE — 97112 NEUROMUSCULAR REEDUCATION: CPT

## 2021-10-06 PROCEDURE — 97110 THERAPEUTIC EXERCISES: CPT

## 2021-10-06 NOTE — PROGRESS NOTES
PT DAILY TREATMENT NOTE    Patient Name: Rafia Kelly  Date:10/6/2021  : 1955  [x]  Patient  Verified  Payor: [place] MEDICARE / Plan: [place] MEDICARE PART A & B / Product Type: Medicare /    In time:930  Out time:1018  Total Treatment Time (min): 48  Total Timed Codes (min): 48  1:1 Treatment Time ( [address] only): 44   Visit #: 7 of 16    Treatment Dx: Left shoulder pain [M25.512]  Left ankle pain [M25.572]    SUBJECTIVE  Pain Level (0-10 scale): 0  Any medication changes, allergies to medications, adverse drug reactions, diagnosis change, or new procedure performed?: [x] No    [] Yes (see summary sheet for update)  Subjective functional status/changes:   [] No changes reported  \"The shoulder is doing well and the ankle is feeling stronger and more stable. \"    OBJECTIVE    25 min Therapeutic Exercise:  [x] See flow sheet :   Rationale: increase ROM, increase strength and decrease pain to improve the patients ability to complete ADLs  ambulation safety and efficiency in order to improve patient's ability to safely ambulate at home for self care. 19 min Neuromuscular Re-education:  [x]  See flow sheet :   Rationale: improve coordination, improve balance and increase proprioception  to improve the patients ability to complete ADLS, and decrease falls risk    With   [] TE   [] TA   [] neuro   [] other: Patient Education: [x] Review HEP    [] Progressed/Changed HEP based on:   [] positioning   [] body mechanics   [] transfers   [] heat/ice application    [] other:      Other Objective/Functional Measures: NA     Pain Level (0-10 scale) post treatment: 0    ASSESSMENT/Changes in Function: Patient making excellent progress towards goals. Will reassess next session to determine if patient is ready for transition to independent self care. Patient responds well to treatment session. No adverse effects were noted from today's treatment session.      Patient will continue to benefit from skilled PT services to modify and progress therapeutic interventions, address functional mobility deficits, address ROM deficits, address strength deficits, analyze and address soft tissue restrictions, analyze and cue movement patterns, analyze and modify body mechanics/ergonomics, assess and modify postural abnormalities,  and instruct in home and community integration to attain remaining goals. [x]  See Plan of Care  []  See progress note/recertification  []  See Discharge Summary         Progress towards goals / Updated goals:  Short Term Goals: To be accomplished in 4 weeks:                   KDVILHB will report compliance with initial/basic HEP at least 1x/day to aid in rehabilitation program.                   Status at IE: Patient instructed in and provided written copy of initial Home Exercise Program.                   Current: Patient reports good compliance with initial HEP and provided written copy of additions to HEP.     9/16/2021                      Patient will increase left shoulder pain free AROM into flexion and abduction to 150 degrees to aid in completion of ADLs.                  Status at IE: flex 132, abd 141                   Current: flex improved to 145, abd remains 165.     9/27/2021, progressing        Long Term Goals: To be accomplished in 8 weeks:                   Patient will report compliance with comprehensive HEP a least 3-4x/week to aid in rehabilitation/strengthening program.                   Status at IE: Patient instructed in and provided written copy of initial Home Exercise Program.                   Current: pt reports continued compliance with HEP 9/24/21                      Patient will report no pain greater than 1-2/10 with overhead activities to aid in completion of ADLs.                  Status at IE: left shoulder 2-8/10                   Current: pt reports 0/10 pain in shoulder and ankle today.  Progressing 9/24/21                      Patient will increase left shoulder and left ankle strength to 5/5 throughout all planes to aid in completion of ADLs.                  Status at IE:  Left shoulder flex and ER 4-, abduction 3+. Left ankle DF, Inv, EV 4                   Current: Left shoulder flex and ER improved to 4+, abduction improved to 4. Left ankle DF, Inv, EV improved to 4+     10/6/2021, progressing                      Patient will increase FOTO (an established functional score where 100 = no disability) score left ankle and left shoulder  to 75 points overall to demonstrate improvement in functional status.                     Status at IE: left shoulder 70, left ankle 57                   Current:  left shoulder 78, left ankle 59    PLAN  []  Upgrade activities as tolerated     [x]  Continue plan of care  []  Update interventions per flow sheet       []  Discharge due to:_  []  Other:_      Fabien Barbosa PT 10/6/2021  9:37 AM    Future Appointments   Date Time Provider Cheryle Jara   10/8/2021  9:30 AM [place] [facility]

## 2021-10-08 ENCOUNTER — APPOINTMENT (OUTPATIENT)
Dept: PHYSICAL THERAPY | Age: 66
End: 2021-10-08
Payer: MEDICARE

## 2021-10-13 ENCOUNTER — HOSPITAL ENCOUNTER (OUTPATIENT)
Dept: PHYSICAL THERAPY | Age: 66
Discharge: HOME | End: 2021-10-13
Payer: MEDICARE

## 2021-10-13 PROCEDURE — 97112 NEUROMUSCULAR REEDUCATION: CPT

## 2021-10-13 PROCEDURE — 97110 THERAPEUTIC EXERCISES: CPT

## 2021-10-13 NOTE — PROGRESS NOTES
[facility] at [address] [place] [address] Drive: [phone]   Fax: [phone]  Discharge Summary  Patient Name: Selin Clemente : 1955   Medical   Diagnosis: Left shoulder pain [M25.512]  Left ankle pain [M25.572] Treatment Diagnosis: Left shoulder pain  Left ankle pain   Onset Date: 2021 - ankle  2021 - shoulder       Referral Source: Oliva Ackerman MD Start of Care [facility] [place]): 9/15/2021   Prior Hospitalization: See medical history Provider #: [id number]   Prior Level of Function: avid bicyclist 2-3 times per week   Comorbidities: hypothyroid, recurrent ankle sprains bilaterally   Medications: Verified on Patient Summary List   ===========================================================================================  Assessment / Summary of Care:  Selin Clemente is a 74 y.o.   male who has been well motivated, consistent and diligent with PT and with HEP and as a result has made excellent progress towards meeting all PT goals. Pain is fully resolved with only minor stiffness in left shoulder remaining. Patient is now fully independent in comprehensive HEP and is ready for transition to independent self care.    ===========================================================================================    Plan: Discharge to independent HEP. Progress Towards Goals:     Short Term Goals: To be accomplished in 4 weeks:                   DDBUHNQ will report compliance with initial/basic HEP at least 1x/day to aid in rehabilitation program.                   Status at IE: Patient instructed in and provided written copy of initial Home Exercise Program.                   Current: Patient reports good compliance with initial HEP and provided written copy of additions to HEP.      2021  MET                      Patient will increase left shoulder pain free AROM into flexion and abduction to 150 degrees to aid in completion of ADLs.                   Status at IE: flex 132, abd 141                   Current: flex improved to 145, abd remains 165.       10/13/2021  MET        Long Term Goals: To be accomplished in 8 weeks:                   Patient will report compliance with comprehensive HEP a least 3-4x/week to aid in rehabilitation/strengthening program.                   Status at IE: Patient instructed in and provided written copy of initial Home Exercise Program.                   Current: Patient now fully independent in comprehensive HEP and reports consistent compliance. 10/13/2021  MET                      Patient will report no pain greater than 1-2/10 with overhead activities to aid in completion of ADLs.                  Status at IE: left shoulder 2-8/10                   Current: Patient reports left shoulder and ankle pain fully resolved . 10/13/2021  MET                      Patient will increase left shoulder and left ankle strength to 5/5 throughout all planes to aid in completion of ADLs.                  Status at IE:  Left shoulder flex and ER 4-, abduction 3+. Left ankle DF, Inv, EV 4                   Current: Left shoulder flex and ER improved to 5,  abduction improved to 5. Left ankle DF, Inv, EV improved to 5.      10/13/2021  MET                      Patient will increase FOTO (an established functional score where 100 = no disability) score left ankle and left shoulder  to 75 points overall to demonstrate improvement in functional status.                     Status at IE: left shoulder 70, left ankle 57                   Current:  left shoulder 80, left ankle 75.        10/13/2021  MET         ===========================================================================================    Therapist Signature: Greg Stover PT, DPT Date: 10/13/2021     Time: 10:10 AM

## 2021-10-13 NOTE — PROGRESS NOTES
Physical Therapy Discharge Instructions    [facility] at [address] [place] [address]  Phone: [phone]   Fax: [phone]      Patient: Taylor Witt  : 1955    Continue Home Exercise Program 1 times per day for 4 weeks, then decrease to 3 times per week      Continue with    [x] Ice  as needed      [] Heat           Follow up with MD:     [] Upon completion of therapy     [x] As needed      Recommendations:     [x]   Return to activity with home program    []   Return to activity with the following modifications:       []Post Rehab Program    []Join Independent aquatic program     []Return to/join local gym      Additional Comments: Please contact clinic at above phone number if you have any questions regarding Home Exercise Program or self care instructions.       Fredo Lofton, PT 10/13/2021 10:12 AM

## 2021-10-13 NOTE — PROGRESS NOTES
PT DAILY TREATMENT NOTE    Patient Name: Jud Ro  Date:10/13/2021  : 1955  [x]  Patient  Verified  Payor: Nimo Rose / Plan: [place] MEDICARE PART A & B / Product Type: Medicare /    In time:925  Out time:1007  Total Treatment Time (min): 42  Total Timed Codes (min): 42  1:1 Treatment Time ([facility] only): 42   Visit #: 8 of 16    Treatment Dx: Left shoulder pain [M25.512]  Left ankle pain [M25.572]    SUBJECTIVE  Pain Level (0-10 scale): 0  Any medication changes, allergies to medications, adverse drug reactions, diagnosis change, or new procedure performed?: [x] No    [] Yes (see summary sheet for update)  Subjective functional status/changes:   [] No changes reported  \"No pain anymore. Just stiffness in the shoulder. But, the stiffness seems to be working out at I continue doing the stretching exercises at home. \"    OBJECTIVE    30 min Therapeutic Exercise:  [x] See flow sheet :   Rationale: increase ROM, increase strength and decrease pain to improve the patients ability to complete ADLs  ambulation safety and efficiency in order to improve patient's ability to safely ambulate at home for self care. 12 min Neuromuscular Re-education:  [x]  See flow sheet :   Rationale: improve coordination, improve balance and increase proprioception  to improve the patients ability to complete ADLS, and decrease falls risk    With   [] TE   [] TA   [] neuro   [] other: Patient Education: [x] Review HEP    [] Progressed/Changed HEP based on:   [] positioning   [] body mechanics   [] transfers   [] heat/ice application    [] other:      Other Objective/Functional Measures: NA     Pain Level (0-10 scale) post treatment: 0    ASSESSMENT/Changes in Function: Patient has been well motivated, consistent and diligent with PT and with HEP and as a result has made excellent progress towards meeting all PT goals. Pain is fully resolved with only minor stiffness in left shoulder remaining.  Patient is now fully independent in comprehensive HEP and is ready for transition to independent self care. Patient responds well to treatment session. No adverse effects were noted from today's treatment session. [x]  See Plan of Care  []  See progress note/recertification  [x]  See Discharge Summary         Progress towards goals / Updated goals:  Short Term Goals: To be accomplished in 4 weeks:                   TFWWAKO will report compliance with initial/basic HEP at least 1x/day to aid in rehabilitation program.                   Status at IE: Patient instructed in and provided written copy of initial Home Exercise Program.                   Current: Patient reports good compliance with initial HEP and provided written copy of additions to HEP.     9/16/2021  MET                      Patient will increase left shoulder pain free AROM into flexion and abduction to 150 degrees to aid in completion of ADLs.                  Status at IE: flex 132, abd 141                   Current: flex improved to 145, abd remains 165.       10/13/2021  MET        Long Term Goals: To be accomplished in 8 weeks:                   Patient will report compliance with comprehensive HEP a least 3-4x/week to aid in rehabilitation/strengthening program.                   Status at IE: Patient instructed in and provided written copy of initial Home Exercise Program.                   Current: Patient now fully independent in comprehensive HEP and reports consistent compliance. 10/13/2021  MET                      Patient will report no pain greater than 1-2/10 with overhead activities to aid in completion of ADLs.                  Status at IE: left shoulder 2-8/10                   Current: Patient reports left shoulder and ankle pain fully resolved . 10/13/2021  MET                      Patient will increase left shoulder and left ankle strength to 5/5 throughout all planes to aid in completion of ADLs.                    Status at IE:  Left shoulder flex and ER 4-, abduction 3+. Left ankle DF, Inv, EV 4                   Current: Left shoulder flex and ER improved to 5,  abduction improved to 5. Left ankle DF, Inv, EV improved to 5.      10/13/2021  MET                      Patient will increase FOTO (an established functional score where 100 = no disability) score left ankle and left shoulder  to 75 points overall to demonstrate improvement in functional status.                  Status at IE: left shoulder 70, left ankle 57                   Current:  left shoulder 80, left ankle 75.        10/13/2021  MET       PLAN  []  Upgrade activities as tolerated     [x]  Continue plan of care  []  Update interventions per flow sheet       [x]  Discharge due to:_ready for transition to independent HEP.   []  Other:_      Iveth Lima, PT 10/13/2021  9:24 AM    Future Appointments   Date Time Provider Cheryle Jara   10/13/2021  9:30 AM Romeo Johnston, PT [facility]

## (undated) DEVICE — SET ADMIN 16ML TBNG L100IN 2 Y INJ SITE IV PIGGY BK DISP

## (undated) DEVICE — SPONGE GZ W4XL4IN RAYON POLY 4 PLY NONWOVEN FASTER WICKING

## (undated) DEVICE — GARMENT,MEDLINE,DVT,INT,CALF,MED, GEN2: Brand: MEDLINE

## (undated) DEVICE — SNARE POLYP SM W13MMXL240CM SHTH DIA2.4MM OVL FLX DISP

## (undated) DEVICE — SYR 5ML 1/5 GRAD LL NSAF LF --

## (undated) DEVICE — CANNULA CUSH AD W/ 14FT TBG

## (undated) DEVICE — WRISTBAND ID AD W2.5XL9.5CM RED VYN ADH CLSR UNI-PRINT

## (undated) DEVICE — SPONGE GZ W4XL4IN COT 12 PLY TYP VII WVN C FLD DSGN

## (undated) DEVICE — KENDALL RADIOLUCENT FOAM MONITORING ELECTRODE RECTANGULAR SHAPE: Brand: KENDALL

## (undated) DEVICE — SYR 3ML LL TIP 1/10ML GRAD --

## (undated) DEVICE — NDL FLTR TIP 5 MIC 18GX1.5IN --

## (undated) DEVICE — SINGLE PORT MANIFOLD: Brand: NEPTUNE 2

## (undated) DEVICE — TRAP SPEC COLL POLYP POLYSTYR --

## (undated) DEVICE — Device

## (undated) DEVICE — TRNQT TEXT 1X18IN BLU LF DISP -- CONVERT TO ITEM 362165

## (undated) DEVICE — TUBING, SUCTION, 1/4" X 12', STRAIGHT: Brand: MEDLINE

## (undated) DEVICE — MAJ-1414 SINGLE USE ADPATER BIOPSY VALV: Brand: SINGLE USE ADAPTOR BIOPSY VALVE

## (undated) DEVICE — SOLUTION IV 500ML 0.9% SOD CHL FLX CONT

## (undated) DEVICE — SYRINGE 50ML E/T

## (undated) DEVICE — CATH IV SAFE STR 22GX1IN BLU -- PROTECTIV PLUS

## (undated) DEVICE — CATH SUC CTRL PRT TRIFLO 14FR --

## (undated) DEVICE — NDL PRT INJ NSAF BLNT 18GX1.5 --